# Patient Record
Sex: FEMALE | Race: WHITE | Employment: STUDENT | URBAN - METROPOLITAN AREA
[De-identification: names, ages, dates, MRNs, and addresses within clinical notes are randomized per-mention and may not be internally consistent; named-entity substitution may affect disease eponyms.]

---
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
## 2025-01-18 ENCOUNTER — TELEPHONE (OUTPATIENT)
Dept: FAMILY MEDICINE CLINIC | Facility: CLINIC | Age: 20
End: 2025-01-18

## 2025-01-18 NOTE — TELEPHONE ENCOUNTER
Patient called and said she got a message on [employer] that she was over due on her Meningococcal vaccine but patient said she thought she has already gotten the vaccine, asking if someone can clarify for her,

## 2025-01-20 NOTE — TELEPHONE ENCOUNTER
Attempted to assist patient with scheduling a nurse visit , Patient reports she is out of [place] for school. Will get vaccine at school.

## 2025-05-21 ENCOUNTER — OFFICE VISIT (OUTPATIENT)
Dept: FAMILY MEDICINE CLINIC | Facility: CLINIC | Age: 20
End: 2025-05-21

## 2025-05-21 ENCOUNTER — LAB ENCOUNTER (OUTPATIENT)
Dept: LAB | Age: 20
End: 2025-05-21
Attending: FAMILY MEDICINE
Payer: COMMERCIAL

## 2025-05-21 VITALS
BODY MASS INDEX: 20 KG/M2 | HEART RATE: 89 BPM | OXYGEN SATURATION: 99 % | WEIGHT: 111 LBS | DIASTOLIC BLOOD PRESSURE: 78 MMHG | SYSTOLIC BLOOD PRESSURE: 117 MMHG

## 2025-05-21 DIAGNOSIS — R10.9 LEFT FLANK PAIN: ICD-10-CM

## 2025-05-21 DIAGNOSIS — R19.7 DIARRHEA, UNSPECIFIED TYPE: ICD-10-CM

## 2025-05-21 DIAGNOSIS — R11.2 NAUSEA AND VOMITING, UNSPECIFIED VOMITING TYPE: ICD-10-CM

## 2025-05-21 DIAGNOSIS — R80.9 PROTEINURIA, UNSPECIFIED TYPE: ICD-10-CM

## 2025-05-21 DIAGNOSIS — K21.9 GASTROESOPHAGEAL REFLUX DISEASE, UNSPECIFIED WHETHER ESOPHAGITIS PRESENT: ICD-10-CM

## 2025-05-21 DIAGNOSIS — F33.1 MAJOR DEPRESSIVE DISORDER, RECURRENT EPISODE, MODERATE (HCC): ICD-10-CM

## 2025-05-21 DIAGNOSIS — Z11.9 SCREENING EXAMINATION FOR INFECTIOUS DISEASE: ICD-10-CM

## 2025-05-21 DIAGNOSIS — R10.9 LEFT FLANK PAIN: Primary | ICD-10-CM

## 2025-05-21 PROBLEM — F32.2 SEVERE MAJOR DEPRESSION (HCC): Status: RESOLVED | Noted: 2021-06-24 | Resolved: 2025-05-21

## 2025-05-21 PROBLEM — Z01.89 NORMAL ESOPHAGOGASTRODUODENOSCOPY (EGD): Status: RESOLVED | Noted: 2024-07-01 | Resolved: 2025-05-21

## 2025-05-21 LAB
ALBUMIN SERPL-MCNC: 5.2 G/DL (ref 3.2–4.8)
ALBUMIN/GLOB SERPL: 2 {RATIO} (ref 1–2)
ALP LIVER SERPL-CCNC: 120 U/L (ref 52–144)
ALT SERPL-CCNC: 33 U/L (ref 10–49)
ANION GAP SERPL CALC-SCNC: 10 MMOL/L (ref 0–18)
AST SERPL-CCNC: 30 U/L (ref ?–34)
BASOPHILS # BLD AUTO: 0.05 X10(3) UL (ref 0–0.2)
BASOPHILS NFR BLD AUTO: 0.7 %
BILIRUB SERPL-MCNC: 0.3 MG/DL (ref 0.3–1.2)
BILIRUB UR QL: NEGATIVE
BUN BLD-MCNC: 9 MG/DL (ref 9–23)
BUN/CREAT SERPL: 11.7 (ref 10–20)
C TRACH DNA SPEC QL NAA+PROBE: NEGATIVE
CALCIUM BLD-MCNC: 10 MG/DL (ref 8.7–10.4)
CHLORIDE SERPL-SCNC: 102 MMOL/L (ref 98–112)
CO2 SERPL-SCNC: 27 MMOL/L (ref 21–32)
COLOR UR: YELLOW
CREAT BLD-MCNC: 0.77 MG/DL (ref 0.55–1.02)
DEPRECATED RDW RBC AUTO: 40.2 FL (ref 35.1–46.3)
EGFRCR SERPLBLD CKD-EPI 2021: 114 ML/MIN/1.73M2 (ref 60–?)
EOSINOPHIL # BLD AUTO: 0.1 X10(3) UL (ref 0–0.7)
EOSINOPHIL NFR BLD AUTO: 1.4 %
ERYTHROCYTE [DISTWIDTH] IN BLOOD BY AUTOMATED COUNT: 13.7 % (ref 11–15)
FASTING STATUS PATIENT QL REPORTED: NO
GLOBULIN PLAS-MCNC: 2.6 G/DL (ref 2–3.5)
GLUCOSE BLD-MCNC: 83 MG/DL (ref 70–99)
GLUCOSE UR-MCNC: NORMAL MG/DL
HCG SERPL QL: NEGATIVE
HCT VFR BLD AUTO: 40 % (ref 35–48)
HGB BLD-MCNC: 12.2 G/DL (ref 12–16)
HGB UR QL STRIP.AUTO: NEGATIVE
IMM GRANULOCYTES # BLD AUTO: 0.03 X10(3) UL (ref 0–1)
IMM GRANULOCYTES NFR BLD: 0.4 %
KETONES UR-MCNC: NEGATIVE MG/DL
LEUKOCYTE ESTERASE UR QL STRIP.AUTO: NEGATIVE
LYMPHOCYTES # BLD AUTO: 2.11 X10(3) UL (ref 1.5–5)
LYMPHOCYTES NFR BLD AUTO: 29.8 %
MCH RBC QN AUTO: 24.6 PG (ref 26–34)
MCHC RBC AUTO-ENTMCNC: 30.5 G/DL (ref 31–37)
MCV RBC AUTO: 80.6 FL (ref 80–100)
MONOCYTES # BLD AUTO: 0.61 X10(3) UL (ref 0.1–1)
MONOCYTES NFR BLD AUTO: 8.6 %
N GONORRHOEA DNA SPEC QL NAA+PROBE: NEGATIVE
NEUTROPHILS # BLD AUTO: 4.19 X10 (3) UL (ref 1.5–7.7)
NEUTROPHILS # BLD AUTO: 4.19 X10(3) UL (ref 1.5–7.7)
NEUTROPHILS NFR BLD AUTO: 59.1 %
NITRITE UR QL STRIP.AUTO: NEGATIVE
OSMOLALITY SERPL CALC.SUM OF ELEC: 286 MOSM/KG (ref 275–295)
PH UR: 8.5 [PH] (ref 5–8)
PLATELET # BLD AUTO: 415 10(3)UL (ref 150–450)
POTASSIUM SERPL-SCNC: 4.3 MMOL/L (ref 3.5–5.1)
PROT SERPL-MCNC: 7.8 G/DL (ref 5.7–8.2)
PROT UR-MCNC: 100 MG/DL
RBC # BLD AUTO: 4.96 X10(6)UL (ref 3.8–5.3)
SODIUM SERPL-SCNC: 139 MMOL/L (ref 136–145)
SP GR UR STRIP: >1.03 (ref 1–1.03)
T PALLIDUM AB SER QL IA: NONREACTIVE
UROBILINOGEN UR STRIP-ACNC: NORMAL
WBC # BLD AUTO: 7.1 X10(3) UL (ref 4–11)

## 2025-05-21 PROCEDURE — 87591 N.GONORRHOEAE DNA AMP PROB: CPT

## 2025-05-21 PROCEDURE — 80053 COMPREHEN METABOLIC PANEL: CPT

## 2025-05-21 PROCEDURE — 36415 COLL VENOUS BLD VENIPUNCTURE: CPT

## 2025-05-21 PROCEDURE — 87491 CHLMYD TRACH DNA AMP PROBE: CPT

## 2025-05-21 PROCEDURE — 81001 URINALYSIS AUTO W/SCOPE: CPT

## 2025-05-21 PROCEDURE — 3074F SYST BP LT 130 MM HG: CPT | Performed by: FAMILY MEDICINE

## 2025-05-21 PROCEDURE — 86780 TREPONEMA PALLIDUM: CPT

## 2025-05-21 PROCEDURE — 3078F DIAST BP <80 MM HG: CPT | Performed by: FAMILY MEDICINE

## 2025-05-21 PROCEDURE — G2211 COMPLEX E/M VISIT ADD ON: HCPCS | Performed by: FAMILY MEDICINE

## 2025-05-21 PROCEDURE — 99214 OFFICE O/P EST MOD 30 MIN: CPT | Performed by: FAMILY MEDICINE

## 2025-05-21 PROCEDURE — 84703 CHORIONIC GONADOTROPIN ASSAY: CPT

## 2025-05-21 PROCEDURE — 86480 TB TEST CELL IMMUN MEASURE: CPT

## 2025-05-21 PROCEDURE — 85025 COMPLETE CBC W/AUTO DIFF WBC: CPT

## 2025-05-21 RX ORDER — VONOPRAZAN FUMARATE 26.72 MG/1
1 TABLET ORAL DAILY
COMMUNITY

## 2025-05-21 NOTE — PROGRESS NOTES
Subjective:   Patient ID: Nasra Mcdaniel is a 19 year old female.    Patient presents for completion of form for study and blood.  Additionally she has had recent left flank pain radiating to left abdomen.        History/Other:   Review of Systems  Current Medications[1]  Allergies:Allergies[2]    Objective:   Physical Exam  Constitutional:       Appearance: Normal appearance.   Cardiovascular:      Rate and Rhythm: Normal rate and regular rhythm.      Pulses: Normal pulses.      Heart sounds: Normal heart sounds.   Pulmonary:      Effort: Pulmonary effort is normal.      Breath sounds: Normal breath sounds.   Abdominal:      Tenderness: There is no abdominal tenderness. There is no right CVA tenderness or left CVA tenderness.   Genitourinary:     Comments: External genitalia normal  Bimanual with retroverted uterus approximately 7 cm  Adnexa without masses   no cervical motion tenderness  Musculoskeletal:      Right lower leg: No edema.      Left lower leg: No edema.   Neurological:      Mental Status: She is alert.         Assessment & Plan:   1. Left flank pain-patient is a student at [employer], preparing for fall semester in [place].  She presents for completion of form today.  She notes left back/flank/pelvic pain increasing over the past day.  LMP about 6 weeks ago, she has not been sexually active with male partner in the past 6 weeks.  She was on OCPs in the past but discontinued about 3 months ago as she was concerned about side effects of medication.  We did discuss contraceptive options today.  She will consider.  Exam benign  Consider small ovarian cyst, musculoskeletal, UTI.  Plan to check labs as ordered.  Recommend Tylenol, heating pad.  If increasing pain consider pelvic ultrasound.   2. Gastroesophageal reflux disease, unspecified whether esophagitis present-significant GERD symptoms over the past 1.5 years.  Has had gastroenterology evaluations including EGD, esophagram, abdominal  ultrasound, colonoscopy.  Has had temporary relief from PPIs but then recurrence.  At this time taking Voquenza and Gaviscon at bedtime.  Discussed concerns with any Gaviscon product that contains aluminum should be avoided on a regular basis.  Occasional use okay.  She has appointment scheduled 1/2026 with [facility] esophageal clinic.   3. Screening examination for infectious disease-form completed.  Check labs as ordered.   4. Major depressive disorder, recurrent episode, moderate (HCC)-seeing therapist.  Not on any antidepressant medications at this time.       Orders Placed This Encounter   Procedures    T Pallidum Screening Weber TREP [E]    Urinalysis with Culture Reflex    Quantiferon TB Plus    Chlamydia/Gc Amplification [E]       Meds This Visit:  Requested Prescriptions      No prescriptions requested or ordered in this encounter       Imaging & Referrals:  None         [1]   Current Outpatient Medications   Medication Sig Dispense Refill    VOQUEZNA 20 MG Oral Tab Take 1 tablet by mouth daily.      clonazePAM 0.5 MG Oral Tab Take 1 tablet (0.5 mg total) by mouth daily as needed for Anxiety. 30 tablet 0    ketoconazole 2 % External Shampoo Apply 120 mL topically twice a week.      clindamycin 1 % External Lotion Apply 1 Application topically every morning.      Tretinoin 0.05 % External Cream apply 1 application topically every night at bedtime     [2]   Allergies  Allergen Reactions    Seasonal OTHER (SEE COMMENTS)     Runny nose

## 2025-05-21 NOTE — PROGRESS NOTES
The following individual(s) verbally consented to be recorded using ambient AI listening technology and understand that they can each withdraw their consent to this listening technology at any point by asking the clinician to turn off or pause the recording:    Patient name: Nasra Mcdaniel  Additional names:

## 2025-05-22 LAB
M TB IFN-G CD4+ T-CELLS BLD-ACNC: 0.03 IU/ML
M TB TUBERC IFN-G BLD QL: NEGATIVE
M TB TUBERC IGNF/MITOGEN IGNF CONTROL: >10 IU/ML
QFT TB1 AG MINUS NIL: 0 IU/ML
QFT TB2 AG MINUS NIL: -0.01 IU/ML

## 2025-05-23 ENCOUNTER — LAB ENCOUNTER (OUTPATIENT)
Dept: LAB | Age: 20
End: 2025-05-23
Attending: FAMILY MEDICINE
Payer: COMMERCIAL

## 2025-05-23 DIAGNOSIS — R80.9 PROTEINURIA, UNSPECIFIED TYPE: ICD-10-CM

## 2025-05-23 LAB
ANION GAP SERPL CALC-SCNC: 7 MMOL/L (ref 0–18)
BILIRUB UR QL: NEGATIVE
BUN BLD-MCNC: 11 MG/DL (ref 9–23)
BUN/CREAT SERPL: 15.9 (ref 10–20)
CALCIUM BLD-MCNC: 9.3 MG/DL (ref 8.7–10.4)
CHLORIDE SERPL-SCNC: 105 MMOL/L (ref 98–112)
CLARITY UR: CLEAR
CO2 SERPL-SCNC: 29 MMOL/L (ref 21–32)
COLOR UR: COLORLESS
CREAT BLD-MCNC: 0.69 MG/DL (ref 0.55–1.02)
CREAT UR-SCNC: 36.7 MG/DL
EGFRCR SERPLBLD CKD-EPI 2021: 128 ML/MIN/1.73M2 (ref 60–?)
FASTING STATUS PATIENT QL REPORTED: NO
GLUCOSE BLD-MCNC: 68 MG/DL (ref 70–99)
GLUCOSE UR-MCNC: NORMAL MG/DL
HGB UR QL STRIP.AUTO: NEGATIVE
KETONES UR-MCNC: NEGATIVE MG/DL
LEUKOCYTE ESTERASE UR QL STRIP.AUTO: NEGATIVE
NITRITE UR QL STRIP.AUTO: NEGATIVE
OSMOLALITY SERPL CALC.SUM OF ELEC: 290 MOSM/KG (ref 275–295)
PH UR: 6 [PH] (ref 5–8)
POTASSIUM SERPL-SCNC: 4.3 MMOL/L (ref 3.5–5.1)
PROT UR-MCNC: <6 MG/DL (ref ?–14)
PROT UR-MCNC: NEGATIVE MG/DL
SODIUM SERPL-SCNC: 141 MMOL/L (ref 136–145)
SP GR UR STRIP: 1.01 (ref 1–1.03)
UROBILINOGEN UR STRIP-ACNC: NORMAL

## 2025-05-23 PROCEDURE — 36415 COLL VENOUS BLD VENIPUNCTURE: CPT

## 2025-05-23 PROCEDURE — 81003 URINALYSIS AUTO W/O SCOPE: CPT

## 2025-05-23 PROCEDURE — 82570 ASSAY OF URINE CREATININE: CPT

## 2025-05-23 PROCEDURE — 80048 BASIC METABOLIC PNL TOTAL CA: CPT

## 2025-05-23 PROCEDURE — 84156 ASSAY OF PROTEIN URINE: CPT

## 2025-08-22 ENCOUNTER — OFFICE VISIT (OUTPATIENT)
Dept: FAMILY MEDICINE CLINIC | Facility: CLINIC | Age: 20
End: 2025-08-22

## 2025-08-22 VITALS
RESPIRATION RATE: 16 BRPM | WEIGHT: 110 LBS | HEIGHT: 63 IN | OXYGEN SATURATION: 97 % | HEART RATE: 91 BPM | SYSTOLIC BLOOD PRESSURE: 106 MMHG | BODY MASS INDEX: 19.49 KG/M2 | TEMPERATURE: 99 F | DIASTOLIC BLOOD PRESSURE: 71 MMHG

## 2025-08-22 DIAGNOSIS — Z00.00 PHYSICAL EXAM: Primary | ICD-10-CM

## 2025-08-22 PROBLEM — L70.0 ACNE VULGARIS: Status: ACTIVE | Noted: 2021-05-20

## 2025-08-22 PROBLEM — L72.3 SEBACEOUS CYST: Status: ACTIVE | Noted: 2025-05-22

## 2025-08-22 PROBLEM — K59.01 SLOW TRANSIT CONSTIPATION: Status: ACTIVE | Noted: 2025-08-22

## 2025-08-22 PROBLEM — D48.5 NEOPLASM OF UNCERTAIN BEHAVIOR OF SKIN: Status: ACTIVE | Noted: 2025-05-22

## 2025-08-22 PROBLEM — L98.9 DISORDER OF SKIN: Status: ACTIVE | Noted: 2025-05-22

## 2025-08-22 RX ORDER — OMEPRAZOLE 40 MG/1
40 CAPSULE, DELAYED RELEASE ORAL DAILY
COMMUNITY
Start: 2025-08-15

## 2025-08-22 RX ORDER — DROSPIRENONE AND ETHINYL ESTRADIOL 0.02-3(28)
1 KIT ORAL DAILY
COMMUNITY
Start: 2025-06-24

## 2025-08-22 RX ORDER — CLINDAMYCIN PHOSPHATE AND BENZOYL PEROXIDE 37.5; 1 MG/G; MG/G
GEL TOPICAL
COMMUNITY
Start: 2025-05-21

## (undated) NOTE — LETTER
[facility] [employer] of Child Health Examination       Student's Name  Orlando Kincaid Signature                                                                                                                                   Title                           Date     Signature Female School   Grade Level/ID#  9th Grade   HEALTH HISTORY          TO BE COMPLETED AND SIGNED BY PARENT/GUARDIAN AND VERIFIED BY HEALTH CARE PROVIDER    ALLERGIES  (Food, drug, insect, other)  Patient has no known allergies.  MEDICATION  (List all prescri PHYSICAL EXAMINATION REQUIREMENTS (head circumference if <33 years old):   BP 92/56   Pulse 73   Temp 98.6 °F (37 °C) (Tympanic)   Resp 18   Ht 5' 1.5\" (1.562 m)   Wt 88 lb 12.8 oz (40.3 kg)   LMP 06/04/2019 (Approximate)   BMI 16.51 kg/m²     DIABETES S Mouth/Dental Yes  Spinal examination Yes    Cardiovascular/HTN Yes  Nutritional status Yes    Respiratory Yes                   Diagnosis of Asthma: No Mental Health Yes        Currently Prescribed Asthma Medication:            Quick-relief  medication (e.

## (undated) NOTE — Clinical Note
VACCINE ADMINISTRATION RECORD  PARENT / GUARDIAN APPROVAL  Date: 2017  Vaccine administered to: Adilene Delgado     : 2005    MRN: [id number]    A copy of the appropriate Centers for Disease Control and Prevention Vaccine Information statement

## (undated) NOTE — LETTER
IMMEDIATE CARE [facility]  [phone]     Patient: Jv Gordon   YOB: 2005   Date of Visit: 8/29/2021     Dear Thao Razo,      August 29, 2021    At [facility], we are taking spe illness if infected. Thus, it is possible that a person known to be infected could leave isolation earlier than a person who is quarantined because of the possibility they are infected.     Please visit the CDC website for further information and details to

## (undated) NOTE — MR AVS SNAPSHOT
[facility]  [address], [address]  [phone]               Thank you for choosing us for your health care visit with Nurse.   We are glad to serve you and happy to provide you with this summary of your visit Fact Sheet: Healthy Active Living for Families    Healthy nutrition starts as early as infancy with breastfeeding. Once your baby begins eating solid foods, introduce nutritious foods early on and often.  Sometimes toddlers need to try a food 10 times befor